# Patient Record
Sex: MALE | NOT HISPANIC OR LATINO | URBAN - METROPOLITAN AREA
[De-identification: names, ages, dates, MRNs, and addresses within clinical notes are randomized per-mention and may not be internally consistent; named-entity substitution may affect disease eponyms.]

---

## 2017-01-14 ENCOUNTER — EMERGENCY (EMERGENCY)
Facility: HOSPITAL | Age: 22
LOS: 1 days | Discharge: HOME | End: 2017-01-14
Attending: EMERGENCY MEDICINE | Admitting: EMERGENCY MEDICINE
Payer: MEDICAID

## 2017-01-14 VITALS
SYSTOLIC BLOOD PRESSURE: 128 MMHG | OXYGEN SATURATION: 100 % | TEMPERATURE: 100 F | RESPIRATION RATE: 16 BRPM | HEART RATE: 98 BPM | DIASTOLIC BLOOD PRESSURE: 84 MMHG

## 2017-01-14 VITALS
TEMPERATURE: 98 F | HEART RATE: 77 BPM | DIASTOLIC BLOOD PRESSURE: 69 MMHG | OXYGEN SATURATION: 99 % | RESPIRATION RATE: 17 BRPM | SYSTOLIC BLOOD PRESSURE: 135 MMHG

## 2017-01-14 LAB
ALBUMIN SERPL ELPH-MCNC: 4.8 G/DL — SIGNIFICANT CHANGE UP (ref 3.3–5)
ALP SERPL-CCNC: 32 U/L — LOW (ref 40–120)
ALT FLD-CCNC: 15 U/L — SIGNIFICANT CHANGE UP (ref 4–41)
APTT BLD: 32.6 SEC — SIGNIFICANT CHANGE UP (ref 27.5–37.4)
AST SERPL-CCNC: 23 U/L — SIGNIFICANT CHANGE UP (ref 4–40)
BASOPHILS # BLD AUTO: 0.02 K/UL — SIGNIFICANT CHANGE UP (ref 0–0.2)
BASOPHILS NFR BLD AUTO: 0.2 % — SIGNIFICANT CHANGE UP (ref 0–2)
BILIRUB SERPL-MCNC: 0.5 MG/DL — SIGNIFICANT CHANGE UP (ref 0.2–1.2)
BUN SERPL-MCNC: 24 MG/DL — HIGH (ref 7–23)
CALCIUM SERPL-MCNC: 9.4 MG/DL — SIGNIFICANT CHANGE UP (ref 8.4–10.5)
CHLORIDE SERPL-SCNC: 99 MMOL/L — SIGNIFICANT CHANGE UP (ref 98–107)
CO2 SERPL-SCNC: 26 MMOL/L — SIGNIFICANT CHANGE UP (ref 22–31)
CREAT SERPL-MCNC: 0.98 MG/DL — SIGNIFICANT CHANGE UP (ref 0.5–1.3)
EOSINOPHIL # BLD AUTO: 0.09 K/UL — SIGNIFICANT CHANGE UP (ref 0–0.5)
EOSINOPHIL NFR BLD AUTO: 1 % — SIGNIFICANT CHANGE UP (ref 0–6)
GLUCOSE SERPL-MCNC: 88 MG/DL — SIGNIFICANT CHANGE UP (ref 70–99)
HCT VFR BLD CALC: 45.9 % — SIGNIFICANT CHANGE UP (ref 39–50)
HGB BLD-MCNC: 15.4 G/DL — SIGNIFICANT CHANGE UP (ref 13–17)
IMM GRANULOCYTES NFR BLD AUTO: 0.3 % — SIGNIFICANT CHANGE UP (ref 0–1.5)
INR BLD: 0.96 — SIGNIFICANT CHANGE UP (ref 0.87–1.18)
LIDOCAIN IGE QN: 36.6 U/L — SIGNIFICANT CHANGE UP (ref 7–60)
LYMPHOCYTES # BLD AUTO: 0.88 K/UL — LOW (ref 1–3.3)
LYMPHOCYTES # BLD AUTO: 9.7 % — LOW (ref 13–44)
MCHC RBC-ENTMCNC: 29.8 PG — SIGNIFICANT CHANGE UP (ref 27–34)
MCHC RBC-ENTMCNC: 33.6 % — SIGNIFICANT CHANGE UP (ref 32–36)
MCV RBC AUTO: 89 FL — SIGNIFICANT CHANGE UP (ref 80–100)
MONOCYTES # BLD AUTO: 0.33 K/UL — SIGNIFICANT CHANGE UP (ref 0–0.9)
MONOCYTES NFR BLD AUTO: 3.6 % — SIGNIFICANT CHANGE UP (ref 2–14)
NEUTROPHILS # BLD AUTO: 7.73 K/UL — HIGH (ref 1.8–7.4)
NEUTROPHILS NFR BLD AUTO: 85.2 % — HIGH (ref 43–77)
OB PNL STL: POSITIVE — SIGNIFICANT CHANGE UP
PLATELET # BLD AUTO: 215 K/UL — SIGNIFICANT CHANGE UP (ref 150–400)
PMV BLD: 10.6 FL — SIGNIFICANT CHANGE UP (ref 7–13)
POTASSIUM SERPL-MCNC: 3.8 MMOL/L — SIGNIFICANT CHANGE UP (ref 3.5–5.3)
POTASSIUM SERPL-SCNC: 3.8 MMOL/L — SIGNIFICANT CHANGE UP (ref 3.5–5.3)
PROT SERPL-MCNC: 7.9 G/DL — SIGNIFICANT CHANGE UP (ref 6–8.3)
PROTHROM AB SERPL-ACNC: 10.9 SEC — SIGNIFICANT CHANGE UP (ref 10–13.1)
RBC # BLD: 5.16 M/UL — SIGNIFICANT CHANGE UP (ref 4.2–5.8)
RBC # FLD: 12.6 % — SIGNIFICANT CHANGE UP (ref 10.3–14.5)
SODIUM SERPL-SCNC: 140 MMOL/L — SIGNIFICANT CHANGE UP (ref 135–145)
WBC # BLD: 9.08 K/UL — SIGNIFICANT CHANGE UP (ref 3.8–10.5)
WBC # FLD AUTO: 9.08 K/UL — SIGNIFICANT CHANGE UP (ref 3.8–10.5)

## 2017-01-14 PROCEDURE — 99284 EMERGENCY DEPT VISIT MOD MDM: CPT

## 2017-01-14 RX ORDER — POLYETHYLENE GLYCOL 3350 17 G/17G
17 POWDER, FOR SOLUTION ORAL
Qty: 119 | Refills: 0 | OUTPATIENT
Start: 2017-01-14 | End: 2017-01-21

## 2017-01-14 RX ORDER — SODIUM CHLORIDE 9 MG/ML
1000 INJECTION INTRAMUSCULAR; INTRAVENOUS; SUBCUTANEOUS ONCE
Qty: 0 | Refills: 0 | Status: COMPLETED | OUTPATIENT
Start: 2017-01-14 | End: 2017-01-14

## 2017-01-14 RX ORDER — ACETAMINOPHEN 500 MG
650 TABLET ORAL ONCE
Qty: 0 | Refills: 0 | Status: COMPLETED | OUTPATIENT
Start: 2017-01-14 | End: 2017-01-14

## 2017-01-14 RX ORDER — ONDANSETRON 8 MG/1
4 TABLET, FILM COATED ORAL ONCE
Qty: 0 | Refills: 0 | Status: COMPLETED | OUTPATIENT
Start: 2017-01-14 | End: 2017-01-14

## 2017-01-14 RX ORDER — FAMOTIDINE 10 MG/ML
20 INJECTION INTRAVENOUS ONCE
Qty: 0 | Refills: 0 | Status: COMPLETED | OUTPATIENT
Start: 2017-01-14 | End: 2017-01-14

## 2017-01-14 RX ADMIN — Medication 30 MILLILITER(S): at 23:29

## 2017-01-14 RX ADMIN — ONDANSETRON 4 MILLIGRAM(S): 8 TABLET, FILM COATED ORAL at 21:54

## 2017-01-14 RX ADMIN — Medication 650 MILLIGRAM(S): at 23:29

## 2017-01-14 RX ADMIN — SODIUM CHLORIDE 1000 MILLILITER(S): 9 INJECTION INTRAMUSCULAR; INTRAVENOUS; SUBCUTANEOUS at 21:54

## 2017-01-14 RX ADMIN — FAMOTIDINE 20 MILLIGRAM(S): 10 INJECTION INTRAVENOUS at 21:54

## 2017-01-14 NOTE — ED ADULT TRIAGE NOTE - CHIEF COMPLAINT QUOTE
States mild abd cramping x 1d. Also c/o nausea & blood in stool x 1 ep with pain during BM. States, "I think my diet is poor." Also c/o weakness & numbness in fingers & feet are numb. Denies fever, vomit, diarrhea. States mild abd cramping x 1d. Also c/o nausea & blood in stool x 1 ep with pain during BM. States, "I think my diet is poor." Also c/o weakness & fingers/feet are numb. Denies fever, vomit, diarrhea.

## 2017-01-14 NOTE — ED PROVIDER NOTE - OBJECTIVE STATEMENT
22 yo M with no Past Medical History that presents with GIB. Pt reports last night and today ate bad junk food, today after work came home and wanted to have BM, noticed BRBPR x 2, continues to feel as if needs BM but cannot. Also bloated and some epigastric pain. No other abnormal food intake including beets or food coloring. Never happened before. No drugs or other illicit ingestions. Pt reports had some straining today. Unusual for him as he normally has 2 BMs per day and not known to have constipation. Not eating a lot of fiber recently. No travel, no trauma. No anal penetration. He reports feeling some weakness since incident. Reports a possible cup of blood since onset symptoms. not on blood thinners. Does drink some alcohol.

## 2017-01-14 NOTE — ED ADULT NURSE NOTE - CHPI ED SYMPTOMS NEG
no abdominal distension/no hematuria/no dysuria/no chills/no fever/no diarrhea/no burning urination/no vomiting

## 2017-01-14 NOTE — ED ADULT NURSE NOTE - OBJECTIVE STATEMENT
pt on bed aox4 ambualtory c/o suprapubic pain since 6pm today with nausea dizziness blood in the stool denies numbness on both arm and feet, CP SOB palpitation will monitor

## 2017-01-14 NOTE — ED PROVIDER NOTE - MEDICAL DECISION MAKING DETAILS
22 yo M with no Past Medical History that presents with GIB. Will obtain labs and provide fluids and reassess. 22 yo M with no Past Medical History that presents with GIB. Will obtain labs and provide fluids and reassess.  Labs WNL including H/H. Bleed most likely from fissure vs internal hemorrhoids. Appears well. Will send home with Rx for miralax as had constipation and f/u with GI MD all referral list given and labs given to pt as well. Return for any further gross hemorrhage or worsening pain/bleeding.

## 2017-01-14 NOTE — ED PROVIDER NOTE - PHYSICAL EXAMINATION
GENERAL: No acute distress, non toxic  HEAD: Atraumatic, normocephalic  EARS: Externally normal, atraumatic, TMs normal bilaterally  EYES: No jaundice, not injected, no rupture, no foreign bodies  MOUTH: Moist mucous membranes, no open lesion, uvula midline without edema, no exudates, no peritonsilar abscess bilaterally.  NECK: Supple, full range of motion, no swelling, no lymphadenopathy  HEART: Regular rate and rhythm, no murmurs, no rubs, no gallops  LUNGS: Clear to auscultation bilaterally without rhonci, rales, or wheezing  ABDOMEN: Soft and non tender in all 4 quadrants, normal bowel sounds, no signs of trauma, no costovertebral tenderness bilaterally  BACK/SPINE: Non tender spine in cervical/thoracic/lumbar regions, no stepoffs palpable  EXTREMITIES: No gross deformities  VASCULAR: Pulses palpable in all extremities, no pitting edema, capillary refill <2 secs  SKIN: Grossly intact without rash or open wounds  PSYCH: Alert and oriented x 3  GAIT: Normal without need for assistance   RECTAL: posterior fissure, bright blood, no hemorrhage. no external hemorrhoids. + internal hemorrhoid. no stool in vault, no masses

## 2017-01-14 NOTE — ED ADULT NURSE NOTE - CHIEF COMPLAINT QUOTE
States mild abd cramping x 1d. Also c/o nausea & blood in stool x 1 ep with pain during BM. States, "I think my diet is poor." Also c/o weakness & fingers/feet are numb. Denies fever, vomit, diarrhea.

## 2017-01-15 LAB
BLD GP AB SCN SERPL QL: NEGATIVE — SIGNIFICANT CHANGE UP
RH IG SCN BLD-IMP: POSITIVE — SIGNIFICANT CHANGE UP

## 2017-07-31 ENCOUNTER — EMERGENCY (EMERGENCY)
Facility: HOSPITAL | Age: 22
LOS: 1 days | Discharge: HOME | End: 2017-07-31
Attending: EMERGENCY MEDICINE | Admitting: EMERGENCY MEDICINE
Payer: MEDICAID

## 2017-07-31 VITALS
SYSTOLIC BLOOD PRESSURE: 165 MMHG | OXYGEN SATURATION: 100 % | DIASTOLIC BLOOD PRESSURE: 45 MMHG | RESPIRATION RATE: 18 BRPM | HEART RATE: 76 BPM

## 2017-07-31 VITALS — OXYGEN SATURATION: 100 % | TEMPERATURE: 98 F | RESPIRATION RATE: 16 BRPM

## 2017-07-31 PROCEDURE — 99284 EMERGENCY DEPT VISIT MOD MDM: CPT

## 2017-07-31 RX ORDER — PENICILLIN G BENZATHINE 1200000 [IU]/2ML
1.2 INJECTION, SUSPENSION INTRAMUSCULAR ONCE
Qty: 0 | Refills: 0 | Status: COMPLETED | OUTPATIENT
Start: 2017-07-31 | End: 2017-07-31

## 2017-07-31 RX ORDER — IBUPROFEN 200 MG
600 TABLET ORAL ONCE
Qty: 0 | Refills: 0 | Status: COMPLETED | OUTPATIENT
Start: 2017-07-31 | End: 2017-07-31

## 2017-07-31 RX ADMIN — Medication 600 MILLIGRAM(S): at 10:13

## 2017-07-31 RX ADMIN — Medication 600 MILLIGRAM(S): at 10:43

## 2017-07-31 RX ADMIN — PENICILLIN G BENZATHINE 1.2 MILLION UNIT(S): 1200000 INJECTION, SUSPENSION INTRAMUSCULAR at 10:43

## 2017-07-31 NOTE — ED PROVIDER NOTE - MEDICAL DECISION MAKING DETAILS
Pharyngitis with exudates. Tmax of 101 with possible sick contact. Offer strep testing vs IM penicillin. Pt prefer IM penicillin. Dc home after abx and pain control Pharyngitis with exudates. Tmax of 101 with possible sick contact. Offered strep testing vs IM penicillin. Pt prefer IM penicillin. Dc home after abx and pain control

## 2017-07-31 NOTE — ED PROVIDER NOTE - CHIEF COMPLAINT
The patient is a 21y Male complaining of The patient is a 21y Male complaining of L earache and sore throat

## 2017-07-31 NOTE — ED PROVIDER NOTE - PROGRESS NOTE DETAILS
Bearle: Discussed with patient need to return to ED if symptoms don't continue to improve or recur or develops any new or worsening symptoms that are of concern. Reviewed discharge instructions for discharge. All pts questions answered. Patient verbalizes understanding.

## 2017-07-31 NOTE — ED PROVIDER NOTE - OBJECTIVE STATEMENT
21y M with hx of bronchitis presents to the ED for fever x3 days and ear pain since today morning. States pt has sore throat and Tmax of fever was 101 21y M with hx of bronchitis presents to the ED for fever x3 days and ear pain since today morning. States pt has sore throat and Tmax of fever was 101. +sick contacts - nephew with similar sx

## 2017-07-31 NOTE — ED ADULT TRIAGE NOTE - CHIEF COMPLAINT QUOTE
Pt with c/o of fever and left ear pain for the past two days states pain radiates to the left side of his face and has cold like symptoms. Pt denies any medical hx.

## 2018-06-23 ENCOUNTER — EMERGENCY (EMERGENCY)
Facility: HOSPITAL | Age: 23
LOS: 1 days | Discharge: HOME | End: 2018-06-23
Admitting: EMERGENCY MEDICINE
Payer: MEDICAID

## 2018-06-23 VITALS
RESPIRATION RATE: 16 BRPM | DIASTOLIC BLOOD PRESSURE: 66 MMHG | HEART RATE: 90 BPM | OXYGEN SATURATION: 100 % | SYSTOLIC BLOOD PRESSURE: 126 MMHG | TEMPERATURE: 98 F

## 2018-06-23 PROCEDURE — 73600 X-RAY EXAM OF ANKLE: CPT | Mod: 26,RT

## 2018-06-23 PROCEDURE — 99284 EMERGENCY DEPT VISIT MOD MDM: CPT

## 2018-06-23 PROCEDURE — 73630 X-RAY EXAM OF FOOT: CPT | Mod: 26,RT

## 2018-06-23 RX ORDER — KETOROLAC TROMETHAMINE 30 MG/ML
30 SYRINGE (ML) INJECTION ONCE
Qty: 0 | Refills: 0 | Status: DISCONTINUED | OUTPATIENT
Start: 2018-06-23 | End: 2018-06-23

## 2018-06-23 RX ADMIN — Medication 30 MILLIGRAM(S): at 14:42

## 2018-06-23 NOTE — ED PROVIDER NOTE - PHYSICAL EXAMINATION
Right ankle: No abrasions. +ecchymosis over lateral dorsal foot with tenderness to 4th and 5th metatarsals. +swelling inferior to lateral malleolus with tenderness to palpation of ligaments. Full ROM b/l. Negative Mena's test. Sensation intact throughout. Able to wiggle toes. Right ankle: No abrasions. +ecchymosis over lateral dorsal foot with tenderness to 4th and 5th metatarsals. +swelling inferior to lateral malleolus with tenderness to palpation of ligaments. Full ROM b/l. Negative Mena's test. Sensation intact throughout. Able to wiggle toes. Dp pulse 2+ b/l.

## 2018-06-23 NOTE — ED PROVIDER NOTE - MEDICAL DECISION MAKING DETAILS
21 y/o male with no pmhx presents with right ankle and foot injury last night. will r/o fracture - possible ankle sprain. pain control, xrays, aircast and crutches

## 2018-06-23 NOTE — ED PROVIDER NOTE - CARE PLAN
Principal Discharge DX:	Ankle sprain  Assessment and plan of treatment:	Follow up with your PMD within 48-72 hours.  Recommend ortho follow up within the week. Referral list provided. Rest, ice and elevate ankle.  Keep aircast on during the day for support. Take Motrin 600mg every 8hrs for pain with food.  Ambulate as tolerated using crutch assistance. Worsening pain, swelling, numbness, weakness or new concerning symptoms return to the Emergency Department.

## 2018-06-23 NOTE — ED PROVIDER NOTE - PLAN OF CARE
Follow up with your PMD within 48-72 hours.  Recommend ortho follow up within the week. Referral list provided. Rest, ice and elevate ankle.  Keep aircast on during the day for support. Take Motrin 600mg every 8hrs for pain with food.  Ambulate as tolerated using crutch assistance. Worsening pain, swelling, numbness, weakness or new concerning symptoms return to the Emergency Department.

## 2018-06-23 NOTE — ED ADULT NURSE NOTE - OBJECTIVE STATEMENT
pt brought to surge, A&Ox3, amb @ baseline, c/o right ankle and foot injury last night at 1am, was "play fighting" with friend and ended up kicking a garbage can, c/o swelling and bruising, denies open skin, head trauma, or LOC, 325mg Tylenol prior to arrival, non-amb 2/2 pain, appears in NAD @ this time, CHANDNI Miranda @ bedside for eval, awaiting orders, will continue to monitor.

## 2018-06-23 NOTE — ED PROVIDER NOTE - OBJECTIVE STATEMENT
21 y/o male with no pmhx presents to ED c/o right ankle and foot injury last night at 1am. States he was "play fighting" with his friend and ended up kicking a garbage can by accident with his foot. c/o swelling and bruising to lateral ankle and top of his foot. No lacerations. No falls or head trauma. No loc. Took two 325mg Tylenol at 11:30am today with minimal relief. Pain 910. Not ambulatory due to pain. No fever, chills, cp, sob, abd pain, n/v, weakness, numbness, tingling.

## 2018-06-28 ENCOUNTER — EMERGENCY (EMERGENCY)
Facility: HOSPITAL | Age: 23
LOS: 1 days | Discharge: HOME | End: 2018-06-28
Attending: EMERGENCY MEDICINE | Admitting: EMERGENCY MEDICINE
Payer: MEDICAID

## 2018-06-28 VITALS
OXYGEN SATURATION: 100 % | SYSTOLIC BLOOD PRESSURE: 120 MMHG | HEART RATE: 86 BPM | TEMPERATURE: 99 F | RESPIRATION RATE: 16 BRPM | DIASTOLIC BLOOD PRESSURE: 60 MMHG

## 2018-06-28 PROCEDURE — 99283 EMERGENCY DEPT VISIT LOW MDM: CPT | Mod: 25

## 2018-06-28 PROCEDURE — 99053 MED SERV 10PM-8AM 24 HR FAC: CPT

## 2018-06-28 NOTE — ED ADULT TRIAGE NOTE - CHIEF COMPLAINT QUOTE
pt. w/ hx. multiple throat / ear infections c/o right ear pain w/ throat pain for 2x days . Pt. states he hears ringing on right ear , and throat pain is constant. Pt. denies any loss of balance appears comfortable in triage.

## 2018-06-29 RX ADMIN — Medication 1 TABLET(S): at 00:31

## 2018-06-29 NOTE — ED PROVIDER NOTE - PLAN OF CARE
Seen in ED for cough, likely viral upper respiratory tract infection.  Please drink plenty of fluids and follow the instructions below:   1)	If you have fever greater than 100F or generalized bodyaches then please take Tylenol 650 mg every 4 hours and Motrin 600 mg every 6 hours.   2)	If you have head congestion, sinus pressure, or nasal congestion then please take Allegra 120-180 mg every 24 hours or Zyrtec use as directed.   3)	If you have throat discomfort please take cough drops, Cepacol, or Chloraseptic spray.   4)	If you have persistent dry cough please take Delsym or Robitussin.   5)	If you have difficulty bringing up mucus please take Mucinex use as directed.   6)	Make a follow-up appointment with your primary doctor.   7)	Return to ED for any new or worsening symptoms.

## 2018-06-29 NOTE — ED PROVIDER NOTE - CARE PLAN
Principal Discharge DX:	Otitis of right ear  Assessment and plan of treatment:	Seen in ED for cough, likely viral upper respiratory tract infection.  Please drink plenty of fluids and follow the instructions below:   1)	If you have fever greater than 100F or generalized bodyaches then please take Tylenol 650 mg every 4 hours and Motrin 600 mg every 6 hours.   2)	If you have head congestion, sinus pressure, or nasal congestion then please take Allegra 120-180 mg every 24 hours or Zyrtec use as directed.   3)	If you have throat discomfort please take cough drops, Cepacol, or Chloraseptic spray.   4)	If you have persistent dry cough please take Delsym or Robitussin.   5)	If you have difficulty bringing up mucus please take Mucinex use as directed.   6)	Make a follow-up appointment with your primary doctor.   7)	Return to ED for any new or worsening symptoms.

## 2018-06-29 NOTE — ED PROVIDER NOTE - OBJECTIVE STATEMENT
00:00 22M c/o sore throat and right ear pain. Past 2 days sore throat, dry cough, right ear pain. Denies f, c, dyspnea. Neg improvement with motrin.

## 2019-01-04 ENCOUNTER — APPOINTMENT (OUTPATIENT)
Dept: UROLOGY | Facility: CLINIC | Age: 24
End: 2019-01-04
Payer: MEDICAID

## 2019-01-04 ENCOUNTER — OUTPATIENT (OUTPATIENT)
Dept: OUTPATIENT SERVICES | Facility: HOSPITAL | Age: 24
LOS: 1 days | End: 2019-01-04

## 2019-01-04 DIAGNOSIS — Z78.9 OTHER SPECIFIED HEALTH STATUS: ICD-10-CM

## 2019-01-04 PROCEDURE — 99203 OFFICE O/P NEW LOW 30 MIN: CPT

## 2019-01-08 LAB
APPEARANCE: CLEAR
BACTERIA: NEGATIVE
BILIRUBIN URINE: NEGATIVE
BLOOD URINE: ABNORMAL
CALCIUM OXALATE CRYSTALS: ABNORMAL
COLOR: YELLOW
GLUCOSE QUALITATIVE U: NEGATIVE MG/DL
GRANULAR CASTS: 0 /LPF
HYALINE CASTS: 0 /LPF
KETONES URINE: NEGATIVE
LEUKOCYTE ESTERASE URINE: NEGATIVE
MICROSCOPIC-UA: NORMAL
NITRITE URINE: NEGATIVE
PH URINE: 6
PROTEIN URINE: 300 MG/DL
RED BLOOD CELLS URINE: 160 /HPF
SPECIFIC GRAVITY URINE: 1.03
SQUAMOUS EPITHELIAL CELLS: 1 /HPF
TRIPLE PHOSPHATE CRYSTALS: NEGATIVE
URIC ACID CRYSTALS: NEGATIVE
UROBILINOGEN URINE: NEGATIVE MG/DL
WHITE BLOOD CELLS URINE: 10 /HPF

## 2019-01-13 ENCOUNTER — FORM ENCOUNTER (OUTPATIENT)
Age: 24
End: 2019-01-13

## 2019-01-14 ENCOUNTER — TRANSCRIPTION ENCOUNTER (OUTPATIENT)
Age: 24
End: 2019-01-14

## 2019-01-14 ENCOUNTER — OUTPATIENT (OUTPATIENT)
Dept: OUTPATIENT SERVICES | Facility: HOSPITAL | Age: 24
LOS: 1 days | End: 2019-01-14
Payer: MEDICAID

## 2019-01-14 ENCOUNTER — APPOINTMENT (OUTPATIENT)
Dept: CT IMAGING | Facility: CLINIC | Age: 24
End: 2019-01-14
Payer: MEDICAID

## 2019-01-14 DIAGNOSIS — R31.29 OTHER MICROSCOPIC HEMATURIA: ICD-10-CM

## 2019-01-14 PROCEDURE — 74178 CT ABD&PLV WO CNTR FLWD CNTR: CPT

## 2019-01-14 PROCEDURE — 74178 CT ABD&PLV WO CNTR FLWD CNTR: CPT | Mod: 26

## 2019-01-22 ENCOUNTER — APPOINTMENT (OUTPATIENT)
Dept: NEPHROLOGY | Facility: CLINIC | Age: 24
End: 2019-01-22
Payer: MEDICAID

## 2019-01-22 ENCOUNTER — LABORATORY RESULT (OUTPATIENT)
Age: 24
End: 2019-01-22

## 2019-01-22 VITALS
HEART RATE: 93 BPM | HEIGHT: 70 IN | WEIGHT: 176.37 LBS | OXYGEN SATURATION: 99 % | BODY MASS INDEX: 25.25 KG/M2 | DIASTOLIC BLOOD PRESSURE: 85 MMHG | SYSTOLIC BLOOD PRESSURE: 135 MMHG

## 2019-01-22 VITALS — DIASTOLIC BLOOD PRESSURE: 72 MMHG | SYSTOLIC BLOOD PRESSURE: 120 MMHG

## 2019-01-22 DIAGNOSIS — F41.1 GENERALIZED ANXIETY DISORDER: ICD-10-CM

## 2019-01-22 PROCEDURE — 99204 OFFICE O/P NEW MOD 45 MIN: CPT

## 2019-01-24 LAB
ANA SER IF-ACNC: NEGATIVE
APPEARANCE: CLEAR
BACTERIA UR CULT: NORMAL
BACTERIA: NEGATIVE
BILIRUBIN URINE: NEGATIVE
BLOOD URINE: ABNORMAL
C3 SERPL-MCNC: 127 MG/DL
C4 SERPL-MCNC: 28 MG/DL
COLOR: YELLOW
CREAT SPEC-SCNC: 251 MG/DL
CREAT/PROT UR: 0.5 RATIO
DEPRECATED KAPPA LC FREE/LAMBDA SER: 1.26 RATIO
DSDNA AB SER-ACNC: <12 IU/ML
GLUCOSE QUALITATIVE U: NEGATIVE MG/DL
HBV SURFACE AB SER QL: REACTIVE
HBV SURFACE AG SER QL: NONREACTIVE
HCV AB SER QL: NONREACTIVE
HCV S/CO RATIO: 0.21 S/CO
HYALINE CASTS: 1 /LPF
IGA SER QL IEP: 305 MG/DL
KAPPA LC CSF-MCNC: 1.33 MG/DL
KAPPA LC SERPL-MCNC: 1.67 MG/DL
KETONES URINE: NEGATIVE
LEUKOCYTE ESTERASE URINE: NEGATIVE
MICROSCOPIC-UA: NORMAL
MPO AB + PR3 PNL SER: NORMAL
NITRITE URINE: NEGATIVE
PH URINE: 7
PROT UR-MCNC: 138 MG/DL
PROTEIN URINE: 100 MG/DL
RED BLOOD CELLS URINE: 200 /HPF
SPECIFIC GRAVITY URINE: 1.03
SQUAMOUS EPITHELIAL CELLS: 1 /HPF
UROBILINOGEN URINE: NEGATIVE MG/DL
WHITE BLOOD CELLS URINE: 3 /HPF

## 2019-01-25 PROBLEM — F41.1 GENERALIZED ANXIETY DISORDER: Status: RESOLVED | Noted: 2019-01-25 | Resolved: 2019-01-25

## 2019-01-25 LAB
PHOSPHOLIPASE A2 RECEPTOR ELISA: <2 RU/ML
PHOSPHOLIPASE A2 RECEPTOR IFA: NEGATIVE

## 2019-01-26 NOTE — ASSESSMENT
[FreeTextEntry1] : 23m with proteinuria/hematuria possibly of glomerular origin.  No clinical evidence of systemic illness and no evidence of primary nephrotic syndrome on exam.  Serologic w/u without evidence of SLE, hepatitis, monoclonal gammopathy, or ANCA associated disease.  Protein/creatinine ratio relatively low at 0.5 so would not do further w/u at this time.  \par \par Will ask him to return to clinic in 3 months and repeat renal panel and p/c ratio.  If he develops abnormal renal function or significant proteinuria (>2grams) would pursue biopsy.\par \par The proteinuria is not the cause of his intermittent back pain.

## 2019-01-26 NOTE — REVIEW OF SYSTEMS
[Feeling Tired] : feeling tired [Chest Pain] : chest pain [Anxiety] : anxiety [Negative] : Integumentary [Fever] : no fever [Chills] : no chills [Feeling Poorly] : not feeling poorly [Dry Eyes] : no dryness of the eyes [Nosebleeds] : no nosebleeds [Sore Throat] : no sore throat [Hoarseness] : no hoarseness [Palpitations] : no palpitations [Cough] : no cough [SOB on Exertion] : no shortness of breath during exertion [Dysuria] : no dysuria [Hesitancy] : no urinary hesitancy [Joint Swelling] : no joint swelling [Skin Lesions] : no skin lesions [Dizziness] : no dizziness [Fainting] : no fainting [FreeTextEntry2] : feels more fatigue than usual [FreeTextEntry4] : 3-4 strep throats most recent in the summer - treated at an urgent care [FreeTextEntry5] : rare CP not associated with exercise.  [FreeTextEntry7] : some right lower quadrant discomfort  worse after urination [FreeTextEntry9] : back pain

## 2019-01-26 NOTE — PHYSICAL EXAM
[General Appearance - Alert] : alert [General Appearance - In No Acute Distress] : in no acute distress [Sclera] : the sclera and conjunctiva were normal [PERRL With Normal Accommodation] : pupils were equal in size, round, and reactive to light [Oropharynx] : the oropharynx was normal [Neck Appearance] : the appearance of the neck was normal [Neck Cervical Mass (___cm)] : no neck mass was observed [Thyroid Diffuse Enlargement] : the thyroid was not enlarged [Respiration, Rhythm And Depth] : normal respiratory rhythm and effort [Exaggerated Use Of Accessory Muscles For Inspiration] : no accessory muscle use [Auscultation Breath Sounds / Voice Sounds] : lungs were clear to auscultation bilaterally [Heart Rate And Rhythm] : heart rate was normal and rhythm regular [Heart Sounds] : normal S1 and S2 [Murmurs] : no murmurs [Arterial Pulses Carotid] : carotid pulses were normal with no bruits [Edema] : there was no peripheral edema [Bowel Sounds] : normal bowel sounds [Abdomen Soft] : soft [Abdomen Tenderness] : non-tender [Abdomen Mass (___ Cm)] : no abdominal mass palpated [Cervical Lymph Nodes Enlarged Posterior Bilaterally] : posterior cervical [Cervical Lymph Nodes Enlarged Anterior Bilaterally] : anterior cervical [Supraclavicular Lymph Nodes Enlarged Bilaterally] : supraclavicular [Nail Clubbing] : no clubbing  or cyanosis of the fingernails [] : no rash [Oriented To Time, Place, And Person] : oriented to person, place, and time [Impaired Insight] : insight and judgment were intact [Affect] : the affect was normal [Mood] : the mood was normal [FreeTextEntry1] : Left lower quadrant well healed scar from surgery 10+ years ago.

## 2019-01-26 NOTE — HISTORY OF PRESENT ILLNESS
[FreeTextEntry1] : 23m who presents for the evaluation of proteinuria/hematuria.\par \par In December 2018 he went to his PCP after a years absence.  He complained of back pain and fatigue and as part of the evaluation he was told he had microscopic blood in his urine.  He was seen by urology where evaluation showed proteinuria as well as hematuria and the CT urogram was unrevealing and now he presents for nephrologic evaluation.  He has no personal or family history of kidney disease.  He has never had visible blood in his urine.  He feels he urinates frequently and at times has bubbles in his urine. \par He has intermittent back pain and headaches for which he takes tylenol or ibuprofen 1-2x/month.\par \par No hospitalization\par No medications\par No allergies\par social hx - works as a .  no etoh, no drugs, no significant OTC medications\par \par PCP - Estuardo Wade

## 2019-01-26 NOTE — ADDENDUM
[FreeTextEntry1] : 1/24/19\par Spoke to patient and reviewed labs.\par Unclear if glomerular in origin or due to blood from cyst.\par RTC 3 months for reevaluation.\par Avoid NSAIDS\par He will talk to PCP about getting referral for PT for back pain.

## 2019-01-26 NOTE — CONSULT LETTER
[Dear  ___] : Dear  [unfilled], [Consult Letter:] : I had the pleasure of evaluating your patient, [unfilled]. [Please see my note below.] : Please see my note below. [Consult Closing:] : Thank you very much for allowing me to participate in the care of this patient.  If you have any questions, please do not hesitate to contact me. [Sincerely,] : Sincerely, [FreeTextEntry3] : Jeramie Davila MD FACP\par Division of Nephrology and Hypertension\par Department of Medicine\par \par  of Medicine\par [employer]\[facility] [employer] [facility]\par  [DrPartha  ___] : Dr. PAULINO

## 2019-02-21 ENCOUNTER — APPOINTMENT (OUTPATIENT)
Dept: INTERNAL MEDICINE | Facility: CLINIC | Age: 24
End: 2019-02-21

## 2019-03-01 ENCOUNTER — APPOINTMENT (OUTPATIENT)
Dept: UROLOGY | Facility: CLINIC | Age: 24
End: 2019-03-01

## 2019-03-04 ENCOUNTER — TRANSCRIPTION ENCOUNTER (OUTPATIENT)
Age: 24
End: 2019-03-04

## 2019-04-09 ENCOUNTER — APPOINTMENT (OUTPATIENT)
Dept: UROLOGY | Facility: CLINIC | Age: 24
End: 2019-04-09
Payer: MEDICAID

## 2019-04-09 LAB
BILIRUB UR QL STRIP: ABNORMAL
CLARITY UR: CLEAR
COLLECTION METHOD: NORMAL
GLUCOSE UR-MCNC: NORMAL
HCG UR QL: 0.2 EU/DL
HGB UR QL STRIP.AUTO: ABNORMAL
KETONES UR-MCNC: ABNORMAL
LEUKOCYTE ESTERASE UR QL STRIP: NORMAL
NITRITE UR QL STRIP: NORMAL
PH UR STRIP: 6.5
PROT UR STRIP-MCNC: 300
SP GR UR STRIP: 1.03

## 2019-04-09 PROCEDURE — 81003 URINALYSIS AUTO W/O SCOPE: CPT | Mod: QW

## 2019-04-09 PROCEDURE — 99214 OFFICE O/P EST MOD 30 MIN: CPT | Mod: 25

## 2019-04-09 NOTE — HISTORY OF PRESENT ILLNESS
[FreeTextEntry1] : Pt is 24 yo male referred because he'd been having some low back pain, and eval with pcp suggested blood in the urine.  Worst back pain was 2 months ago, restarted 2 weeks.  Not specifically associated with phys activity.  Never gross hematuria.  No known  history, no dysuria.  Pt notes urinary frequency which has been 'a few months'.  He reports drinking a lot of fluids (primarily water), and does note moderate caffeine intake.\par \par Has mildly hyperdense cyst.

## 2019-04-09 NOTE — ASSESSMENT
[FreeTextEntry1] : We had long discussion.\par \par u/a today with continued blood protein\par \par back pain improved- no sig tenderness.\par \par We discussed local therapy, massage, potential benefits from acupuncture/phys therapy, etc for back pain\par Pt to f/u with Dr. Davila as well\par \par RTC ~ 4 months with renal us to assess

## 2019-04-09 NOTE — PHYSICAL EXAM
[General Appearance - Well Developed] : well developed [General Appearance - Well Nourished] : well nourished [Normal Appearance] : normal appearance [Well Groomed] : well groomed [General Appearance - In No Acute Distress] : no acute distress [Abdomen Tenderness] : non-tender [Costovertebral Angle Tenderness] : no ~M costovertebral angle tenderness [Abdomen Soft] : soft [Penis Abnormality] : normal circumcised penis [Urethral Meatus] : meatus normal [Urinary Bladder Findings] : the bladder was normal on palpation [Epididymis] : the epididymides were normal [Scrotum] : the scrotum was normal [Testes Mass (___cm)] : there were no testicular masses [Exam Deferred] : was deferred [Edema] : no peripheral edema [] : no respiratory distress [Respiration, Rhythm And Depth] : normal respiratory rhythm and effort [Exaggerated Use Of Accessory Muscles For Inspiration] : no accessory muscle use [Oriented To Time, Place, And Person] : oriented to person, place, and time [Mood] : the mood was normal [Affect] : the affect was normal [Normal Station and Gait] : the gait and station were normal for the patient's age [Not Anxious] : not anxious [No Focal Deficits] : no focal deficits

## 2019-04-10 LAB
APPEARANCE: ABNORMAL
BACTERIA: NEGATIVE
BILIRUBIN URINE: NEGATIVE
BLOOD URINE: ABNORMAL
COLOR: YELLOW
GLUCOSE QUALITATIVE U: NEGATIVE
HYALINE CASTS: 4 /LPF
KETONES URINE: NEGATIVE
LEUKOCYTE ESTERASE URINE: NEGATIVE
MICROSCOPIC-UA: NORMAL
NITRITE URINE: NEGATIVE
PH URINE: 6.5
PROTEIN URINE: ABNORMAL
RED BLOOD CELLS URINE: 8 /HPF
SPECIFIC GRAVITY URINE: 1.03
SQUAMOUS EPITHELIAL CELLS: 1 /HPF
UROBILINOGEN URINE: NORMAL
WHITE BLOOD CELLS URINE: 3 /HPF

## 2019-04-17 ENCOUNTER — APPOINTMENT (OUTPATIENT)
Dept: NEPHROLOGY | Facility: CLINIC | Age: 24
End: 2019-04-17
Payer: MEDICAID

## 2019-04-17 VITALS
SYSTOLIC BLOOD PRESSURE: 105 MMHG | HEART RATE: 76 BPM | DIASTOLIC BLOOD PRESSURE: 70 MMHG | WEIGHT: 178 LBS | HEIGHT: 70 IN | OXYGEN SATURATION: 99 % | BODY MASS INDEX: 25.48 KG/M2

## 2019-04-17 PROCEDURE — 99214 OFFICE O/P EST MOD 30 MIN: CPT

## 2019-04-18 LAB
ANION GAP SERPL CALC-SCNC: 10 MMOL/L
BUN SERPL-MCNC: 14 MG/DL
CALCIUM SERPL-MCNC: 10 MG/DL
CHLORIDE SERPL-SCNC: 102 MMOL/L
CO2 SERPL-SCNC: 29 MMOL/L
CREAT SERPL-MCNC: 1.08 MG/DL
CREAT SPEC-SCNC: 230 MG/DL
CREAT/PROT UR: 0.2 RATIO
GLUCOSE SERPL-MCNC: 86 MG/DL
POTASSIUM SERPL-SCNC: 4.4 MMOL/L
PROT UR-MCNC: 39 MG/DL
SODIUM SERPL-SCNC: 141 MMOL/L

## 2019-04-21 NOTE — HISTORY OF PRESENT ILLNESS
[FreeTextEntry1] : 23m who presents for the evaluation of proteinuria/hematuria.\par \par In December 2018 he went to his PCP after a years absence.  He complained of back pain and fatigue and as part of the evaluation he was told he had microscopic blood in his urine.  He was seen by urology where evaluation showed proteinuria as well as hematuria and the CT urogram was unrevealing.  He has no personal or family history of kidney disease.  He has never had visible blood in his urine.  \par \par \par Over the past 3 months doing well.\par Gets back pain when he doesn't get a lot of rest but overall better.\par Recently went to [place] which was transformative for him.

## 2019-04-21 NOTE — ASSESSMENT
[FreeTextEntry1] : 23m with proteinuria/hematuria possibly of glomerular origin. No clinical evidence of systemic illness and no evidence of primary nephrotic syndrome on exam. Serologic w/u without evidence of SLE, hepatitis, monoclonal gammopathy, or ANCA associated disease. Protein/creatinine ratio <1gm so being followed clinically.  Potential etiologies include IgA or think basement membrane disease or urologic in origin.\par \par Plan:\par Check BMP and P/C ratio.\par If creatinine becomes abnormal of if proteinuria worsens would consider kidney biopsy.\par \par RTC 3-4 months

## 2019-04-21 NOTE — CONSULT LETTER
[Dear  ___] : Dear  [unfilled], [Consult Letter:] : I had the pleasure of evaluating your patient, [unfilled]. [Please see my note below.] : Please see my note below. [Consult Closing:] : Thank you very much for allowing me to participate in the care of this patient.  If you have any questions, please do not hesitate to contact me. [Sincerely,] : Sincerely, [FreeTextEntry3] : Jeramie Davila MD FACP\par Division of Nephrology and Hypertension\par Department of Medicine\par \par  of Medicine\par [employer]\[facility] [employer] at [facility]\par  [DrPartha  ___] : Dr. PAULINO

## 2019-04-21 NOTE — ADDENDUM
[FreeTextEntry1] : 4/18/19\par Reviewed labs with patient.\par lytes normal and p/c normal\par RTC 4 months\par

## 2019-04-21 NOTE — PHYSICAL EXAM
[General Appearance - Alert] : alert [General Appearance - In No Acute Distress] : in no acute distress [Sclera] : the sclera and conjunctiva were normal [PERRL With Normal Accommodation] : pupils were equal in size, round, and reactive to light [Neck Appearance] : the appearance of the neck was normal [Oropharynx] : the oropharynx was normal [Neck Cervical Mass (___cm)] : no neck mass was observed [Thyroid Diffuse Enlargement] : the thyroid was not enlarged [Respiration, Rhythm And Depth] : normal respiratory rhythm and effort [Auscultation Breath Sounds / Voice Sounds] : lungs were clear to auscultation bilaterally [Exaggerated Use Of Accessory Muscles For Inspiration] : no accessory muscle use [Heart Sounds] : normal S1 and S2 [Heart Rate And Rhythm] : heart rate was normal and rhythm regular [Arterial Pulses Carotid] : carotid pulses were normal with no bruits [Murmurs] : no murmurs [Edema] : there was no peripheral edema [Abdomen Soft] : soft [Abdomen Tenderness] : non-tender [Cervical Lymph Nodes Enlarged Posterior Bilaterally] : posterior cervical [Cervical Lymph Nodes Enlarged Anterior Bilaterally] : anterior cervical [Nail Clubbing] : no clubbing  or cyanosis of the fingernails [Supraclavicular Lymph Nodes Enlarged Bilaterally] : supraclavicular [] : no rash [Affect] : the affect was normal [Mood] : the mood was normal [General Appearance - Well Nourished] : well nourished [FreeTextEntry1] : Left lower quadrant well healed scar from surgery 10+ years ago.

## 2019-04-21 NOTE — REVIEW OF SYSTEMS
[Feeling Tired] : feeling tired [Fever] : no fever [Chills] : no chills [Feeling Poorly] : not feeling poorly [Dry Eyes] : no dryness of the eyes [Nosebleeds] : no nosebleeds [Sore Throat] : no sore throat [Hoarseness] : no hoarseness [Chest Pain] : no chest pain [Palpitations] : no palpitations [Cough] : no cough [SOB on Exertion] : no shortness of breath during exertion [Dysuria] : no dysuria [Hesitancy] : no urinary hesitancy [As Noted in HPI] : as noted in HPI [Joint Swelling] : no joint swelling [Skin Lesions] : no skin lesions [Dizziness] : no dizziness [Fainting] : no fainting [Anxiety] : no anxiety [Negative] : Gastrointestinal [FreeTextEntry2] : back pain makes him tired [FreeTextEntry4] : 3-4 strep throats most recent in the summer - treated at an urgent care - none recently [FreeTextEntry5] : rare CP not associated with exercise - none recently [FreeTextEntry7] : prior right lower quadrant discomfort worse after urination -- resolved [de-identified] : Anxiety better after trip [FreeTextEntry9] : back pain

## 2019-05-08 ENCOUNTER — APPOINTMENT (OUTPATIENT)
Dept: INTERNAL MEDICINE | Facility: CLINIC | Age: 24
End: 2019-05-08

## 2019-08-13 ENCOUNTER — APPOINTMENT (OUTPATIENT)
Dept: UROLOGY | Facility: CLINIC | Age: 24
End: 2019-08-13
Payer: COMMERCIAL

## 2019-08-13 DIAGNOSIS — R80.9 PROTEINURIA, UNSPECIFIED: ICD-10-CM

## 2019-08-13 PROCEDURE — 76775 US EXAM ABDO BACK WALL LIM: CPT

## 2019-08-13 PROCEDURE — 99213 OFFICE O/P EST LOW 20 MIN: CPT | Mod: 25

## 2019-08-13 NOTE — ASSESSMENT
[FreeTextEntry1] : Renal US today: no stone, no hydro, parenchyma symmetric.  Cyst left upper appears simple, and without doppler flow.  No solid renal mass.\par \par We discussed f/u with Dr. Davila, and with me in the future.\par \par Given earlier mild complexity (proteinaceous), would see in 1 year with renal us.

## 2019-08-13 NOTE — PHYSICAL EXAM
[General Appearance - Well Developed] : well developed [Normal Appearance] : normal appearance [Well Groomed] : well groomed [General Appearance - Well Nourished] : well nourished [Edema] : no peripheral edema [General Appearance - In No Acute Distress] : no acute distress [] : no respiratory distress [Respiration, Rhythm And Depth] : normal respiratory rhythm and effort [Exaggerated Use Of Accessory Muscles For Inspiration] : no accessory muscle use [Oriented To Time, Place, And Person] : oriented to person, place, and time [Affect] : the affect was normal [Mood] : the mood was normal [Not Anxious] : not anxious [Normal Station and Gait] : the gait and station were normal for the patient's age [No Focal Deficits] : no focal deficits

## 2019-08-13 NOTE — REVIEW OF SYSTEMS
[Chills] : chills [Feeling Tired] : feeling tired [Recent Weight Gain (___ Lbs)] : recent [unfilled] ~Ulb weight gain [Dry Eyes] : dryness of the eyes [Sore Throat] : sore throat [Chest Pain] : chest pain [Shortness Of Breath] : shortness of breath [Wheezing] : wheezing [Diarrhea] : diarrhea [Heartburn] : heartburn [Wake up at night to urinate  How many times?  ___] : wakes up to urinate [unfilled] times during the night [Told you have blood in urine on a urine test] : told blood was present in a urine test [Joint Pain] : joint pain [Strain or push to urinate] : strain or push to urinate [Dizziness] : dizziness [Anxiety] : anxiety [Muscle Weakness] : muscle weakness [Feelings Of Weakness] : feelings of weakness [Negative] : Heme/Lymph

## 2019-08-13 NOTE — HISTORY OF PRESENT ILLNESS
[None] : no symptoms [FreeTextEntry1] : Pt is 24 yo male referred because he'd been having some low back pain, and eval with pcp suggested blood in the urine.  No further pain.  Feeling well.  Never gross hematuria.  No known  history, no dysuria.  Pt notes urinary frequency which has been 'a few months'.  He reports drinking a lot of fluids (primarily water), and does note moderate caffeine intake.\par \par Has mildly hyperdense cyst  ~ 2.2 x 1.5 cm on prior imaging with CT urogram, without contrast enhancement---> appears Bozniak 2 cyst.  \par \par Seen by Dr. Davila without acute issues on f/u labs.  F/u plan in place.\par \par We have avoided cysto based on renal findings and age.

## 2020-08-25 ENCOUNTER — APPOINTMENT (OUTPATIENT)
Dept: UROLOGY | Facility: CLINIC | Age: 25
End: 2020-08-25

## 2020-08-27 ENCOUNTER — APPOINTMENT (OUTPATIENT)
Dept: UROLOGY | Facility: CLINIC | Age: 25
End: 2020-08-27

## 2020-10-20 ENCOUNTER — APPOINTMENT (OUTPATIENT)
Dept: UROLOGY | Facility: CLINIC | Age: 25
End: 2020-10-20

## 2021-02-11 ENCOUNTER — APPOINTMENT (OUTPATIENT)
Dept: UROLOGY | Facility: CLINIC | Age: 26
End: 2021-02-11
Payer: COMMERCIAL

## 2021-02-11 VITALS — TEMPERATURE: 97.2 F

## 2021-02-11 DIAGNOSIS — R31.29 OTHER MICROSCOPIC HEMATURIA: ICD-10-CM

## 2021-02-11 PROCEDURE — 99072 ADDL SUPL MATRL&STAF TM PHE: CPT

## 2021-02-11 PROCEDURE — 99213 OFFICE O/P EST LOW 20 MIN: CPT

## 2021-02-11 NOTE — ASSESSMENT
[FreeTextEntry1] : 1. u/a with micro today\par 2. renal US to f/u cyst- if remains simple, will sign off, and can f/u on as needed basis.\par \par Will review by phone, or telehealth

## 2021-02-11 NOTE — HISTORY OF PRESENT ILLNESS
[FreeTextEntry1] : Pt is 24 yo male initially referred ~ 8/2019 ---> because he'd been having some low back pain, and eval with pcp suggested blood in the urine. No further pain at this time. F/u delayed due to COVID. Feeling well. Never gross hematuria. No known  history, no dysuria. 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 [None] : no symptoms

## 2021-02-13 LAB
APPEARANCE: ABNORMAL
BACTERIA: NEGATIVE
BILIRUBIN URINE: NEGATIVE
BLOOD URINE: NORMAL
CALCIUM OXALATE CRYSTALS: ABNORMAL
COLOR: ABNORMAL
GLUCOSE QUALITATIVE U: NEGATIVE
HYALINE CASTS: 1 /LPF
KETONES URINE: NEGATIVE
LEUKOCYTE ESTERASE URINE: NEGATIVE
MICROSCOPIC-UA: NORMAL
NITRITE URINE: NEGATIVE
PH URINE: 6
PROTEIN URINE: ABNORMAL
RED BLOOD CELLS URINE: 4 /HPF
SPECIFIC GRAVITY URINE: 1.03
SQUAMOUS EPITHELIAL CELLS: 0 /HPF
URINE COMMENTS: NORMAL
UROBILINOGEN URINE: NORMAL
WHITE BLOOD CELLS URINE: 1 /HPF

## 2021-04-08 ENCOUNTER — APPOINTMENT (OUTPATIENT)
Dept: UROLOGY | Facility: CLINIC | Age: 26
End: 2021-04-08

## 2021-04-15 ENCOUNTER — APPOINTMENT (OUTPATIENT)
Dept: UROLOGY | Facility: CLINIC | Age: 26
End: 2021-04-15

## 2021-06-24 ENCOUNTER — APPOINTMENT (OUTPATIENT)
Dept: UROLOGY | Facility: CLINIC | Age: 26
End: 2021-06-24
Payer: COMMERCIAL

## 2021-06-24 VITALS
DIASTOLIC BLOOD PRESSURE: 75 MMHG | SYSTOLIC BLOOD PRESSURE: 112 MMHG | TEMPERATURE: 97.6 F | HEART RATE: 74 BPM | WEIGHT: 158 LBS | HEIGHT: 70 IN | OXYGEN SATURATION: 98 % | BODY MASS INDEX: 22.62 KG/M2

## 2021-06-24 DIAGNOSIS — N28.1 CYST OF KIDNEY, ACQUIRED: ICD-10-CM

## 2021-06-24 DIAGNOSIS — G43.909 MIGRAINE, UNSPECIFIED, NOT INTRACTABLE, W/OUT STATUS MIGRAINOSUS: ICD-10-CM

## 2021-06-24 PROCEDURE — 99213 OFFICE O/P EST LOW 20 MIN: CPT

## 2021-06-24 PROCEDURE — 99072 ADDL SUPL MATRL&STAF TM PHE: CPT

## 2021-06-24 NOTE — HISTORY OF PRESENT ILLNESS
[FreeTextEntry1] : Pt is 26 yo male initially referred ~ 8/2019 ---> because he'd been having some low back pain, and eval with pcp suggested blood in the urine. No further pain at this time. F/u delayed due to COVID. Feeling well. Never gross hematuria. No known  history, no dysuria. 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

## 2023-08-31 NOTE — REVIEW OF SYSTEMS
[Chills] : chills [Dry Eyes] : dryness of the eyes [Feeling Tired] : feeling tired [Recent Weight Gain (___ Lbs)] : recent [unfilled] ~Ulb weight gain [Sore Throat] : sore throat [Chest Pain] : chest pain [Wheezing] : wheezing [Shortness Of Breath] : shortness of breath [Heartburn] : heartburn [Diarrhea] : diarrhea [Wake up at night to urinate  How many times?  ___] : wakes up to urinate [unfilled] times during the night [Told you have blood in urine on a urine test] : told blood was present in a urine test [Joint Pain] : joint pain [Strain or push to urinate] : strain or push to urinate [Dizziness] : dizziness [Anxiety] : anxiety [Muscle Weakness] : muscle weakness [Feelings Of Weakness] : feelings of weakness [Negative] : Heme/Lymph no

## 2024-09-11 ENCOUNTER — APPOINTMENT (OUTPATIENT)
Dept: UROLOGY | Facility: CLINIC | Age: 29
End: 2024-09-11